# Patient Record
Sex: MALE | Race: WHITE | NOT HISPANIC OR LATINO | Employment: FULL TIME | URBAN - NONMETROPOLITAN AREA
[De-identification: names, ages, dates, MRNs, and addresses within clinical notes are randomized per-mention and may not be internally consistent; named-entity substitution may affect disease eponyms.]

---
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
## 2019-03-05 ENCOUNTER — OFFICE VISIT (OUTPATIENT)
Dept: URGENT CARE | Facility: PHYSICIAN GROUP | Age: 19
End: 2019-03-05
Payer: COMMERCIAL

## 2019-03-05 ENCOUNTER — APPOINTMENT (OUTPATIENT)
Dept: RADIOLOGY | Facility: IMAGING CENTER | Age: 19
End: 2019-03-05
Attending: FAMILY MEDICINE
Payer: COMMERCIAL

## 2019-03-05 VITALS
HEIGHT: 73 IN | HEART RATE: 98 BPM | TEMPERATURE: 98.7 F | DIASTOLIC BLOOD PRESSURE: 68 MMHG | WEIGHT: 188 LBS | SYSTOLIC BLOOD PRESSURE: 120 MMHG | OXYGEN SATURATION: 100 % | BODY MASS INDEX: 24.92 KG/M2 | RESPIRATION RATE: 16 BRPM

## 2019-03-05 DIAGNOSIS — S90.112A CONTUSION OF LEFT GREAT TOE WITHOUT DAMAGE TO NAIL, INITIAL ENCOUNTER: ICD-10-CM

## 2019-03-05 DIAGNOSIS — S99.922A INJURY OF TOE ON LEFT FOOT, INITIAL ENCOUNTER: ICD-10-CM

## 2019-03-05 PROCEDURE — 73660 X-RAY EXAM OF TOE(S): CPT | Mod: TC,FY,LT | Performed by: FAMILY MEDICINE

## 2019-03-05 PROCEDURE — 99204 OFFICE O/P NEW MOD 45 MIN: CPT | Performed by: FAMILY MEDICINE

## 2019-03-05 NOTE — PROGRESS NOTES
Chief Complaint:    Chief Complaint   Patient presents with   • Toe Injury     L big toe/ smashed it happened yesterday/        History of Present Illness:    This is a new problem. He has pain and bruising in left 1st toe, at least moderate severity, and not getting better. He was skateboarding yesterday, was doing a trick, and skateboard landed directly on toe. Took Ibuprofen for symptoms with some temporary help. He has broken a bone in foot in past and was still able to walk around on it.      Review of Systems:    Constitutional: Negative for fever, chills, and diaphoresis.   Eyes: Negative for change in vision, photophobia, pain, redness, and discharge.  ENT: Negative for ear pain, ear discharge, hearing loss, tinnitus, nasal congestion, nosebleeds, and sore throat.    Respiratory: Negative for cough, hemoptysis, sputum production, shortness of breath, wheezing, and stridor.    Cardiovascular: Negative for chest pain, palpitations, orthopnea, claudication, leg swelling, and PND.   Gastrointestinal: Negative for abdominal pain, nausea, vomiting, diarrhea, constipation, blood in stool, and melena.   Genitourinary: Negative for dysuria, urinary urgency, urinary frequency, hematuria, and flank pain.   Musculoskeletal: See HPI.  Skin: Negative for rash and itching.   Neurological: Negative for dizziness, tingling, tremors, sensory change, speech change, focal weakness, seizures, loss of consciousness, and headaches.   Endo: Negative for polydipsia.   Heme: Does not bruise/bleed easily.   Psychiatric/Behavioral: Negative for depression, suicidal ideas, hallucinations, memory loss and substance abuse. The patient is not nervous/anxious and does not have insomnia.        Past Medical History:    Past Medical History:   Diagnosis Date   • ASTHMA      Past Surgical History:    History reviewed. No pertinent surgical history.    Social History:    Social History     Social History   • Marital status: Single     Spouse  "name: N/A   • Number of children: N/A   • Years of education: N/A     Occupational History   • Not on file.     Social History Main Topics   • Smoking status: Never Smoker   • Smokeless tobacco: Never Used   • Alcohol use No   • Drug use: No   • Sexual activity: No     Other Topics Concern   • Not on file     Social History Narrative   • No narrative on file     Family History:    History reviewed. No pertinent family history.    Medications:    Current Outpatient Prescriptions on File Prior to Visit   Medication Sig Dispense Refill   • cetirizine (ZYRTEC) 10 MG TABS Take 10 mg by mouth every day.       No current facility-administered medications on file prior to visit.      Allergies:    Allergies   Allergen Reactions   • No Known Drug Allergy        Vitals:    Vitals:    03/05/19 1432   BP: 120/68   Pulse: 98   Resp: 16   Temp: 37.1 °C (98.7 °F)   TempSrc: Temporal   SpO2: 100%   Weight: 85.3 kg (188 lb)   Height: 1.854 m (6' 1\")       Physical Exam:    Constitutional: Vital signs reviewed. Appears well-developed and well-nourished. No acute distress.   Eyes: Sclera white, conjunctivae clear.  ENT: External ears normal. Hearing normal.  Cardiovascular: Peripheral pulses 2+.   Pulmonary/Chest: Respirations non-labored.  Musculoskeletal: Left 1st toe: tenderness to palpation and soft tissue swelling. Bruising beneath proximal nail.   Neurological: Alert and oriented to person, place, and time. Muscle tone normal. Coordination normal. Light touch and sensation normal.   Skin: No rashes or lesions. Warm, dry, normal turgor.  Psychiatric: Normal mood and affect. Behavior is normal. Judgment and thought content normal.       Diagnostics:    DX-TOE(S) 2+ (Order #[id number]) on 3/5/19   Narrative       3/5/2019 2:46 PM    HISTORY/REASON FOR EXAM:  Pain/Deformity Following Trauma.  Great toe injury yesterday    TECHNIQUE/EXAM DESCRIPTION AND NUMBER OF VIEWS:  3 views of the LEFT toes.    COMPARISON: Contralateral foot " 7/13/2017    FINDINGS:  No acute fracture or subluxation is seen.    Joint spaces are preserved    There is chronic second distal interphalangeal joint deformity with valgus angulation, identical to the contralateral foot indicating this is developmental.    No periosteal reaction   Impression       No radiographic evidence of acute traumatic injury     I personally reviewed the images. Images and Rad report reviewed with him and copies to him.      Assessment / Plan:    1. Injury of toe on left foot, initial encounter  - DX-TOE(S) 2+ LEFT; Future    2. Contusion of left great toe without damage to nail, initial encounter      Work note given - excuse for 3/5/19.    Discussed with him DDX, management options, and risks, benefits, and alternatives to treatment plan agreed upon.    Rec'd relative rest.    May continue over-the-counter Ibuprofen (Motrin or Advil) as needed for pain and swelling for anti-inflammatory effect.    Discussed expected course of duration, time for improvement, and to seek follow-up in Emergency Room, urgent care, or with PCP if getting worse at any time or not improving within expected time frame.

## 2019-03-05 NOTE — LETTER
March 5, 2019         Patient: Geronimo Huerta   YOB: 2000   Date of Visit: 3/5/2019           To Whom it May Concern:    Geronimo Huerta was seen in my clinic on 3/5/2019.     Please excuse from work for 3/5/19 due to medical condition.    If you have any questions or concerns, please don't hesitate to call.        Sincerely,           Denny Keene M.D.  Electronically Signed

## 2019-08-20 ENCOUNTER — HOSPITAL ENCOUNTER (OUTPATIENT)
Dept: HOSPITAL 8 - CFH | Age: 19
Discharge: HOME | End: 2019-08-20
Attending: NURSE PRACTITIONER
Payer: COMMERCIAL

## 2019-08-20 DIAGNOSIS — R22.2: Primary | ICD-10-CM

## 2019-08-20 PROCEDURE — 76604 US EXAM CHEST: CPT

## 2019-11-01 ENCOUNTER — OFFICE VISIT (OUTPATIENT)
Dept: URGENT CARE | Facility: CLINIC | Age: 19
End: 2019-11-01
Payer: COMMERCIAL

## 2019-11-01 VITALS
OXYGEN SATURATION: 97 % | DIASTOLIC BLOOD PRESSURE: 70 MMHG | WEIGHT: 199 LBS | SYSTOLIC BLOOD PRESSURE: 122 MMHG | RESPIRATION RATE: 14 BRPM | HEIGHT: 73 IN | TEMPERATURE: 98.8 F | HEART RATE: 82 BPM | BODY MASS INDEX: 26.37 KG/M2

## 2019-11-01 DIAGNOSIS — J01.40 ACUTE PANSINUSITIS, RECURRENCE NOT SPECIFIED: ICD-10-CM

## 2019-11-01 PROCEDURE — 99214 OFFICE O/P EST MOD 30 MIN: CPT | Performed by: NURSE PRACTITIONER

## 2019-11-01 RX ORDER — AMOXICILLIN AND CLAVULANATE POTASSIUM 875; 125 MG/1; MG/1
1 TABLET, FILM COATED ORAL 2 TIMES DAILY
Qty: 14 TAB | Refills: 0 | Status: SHIPPED | OUTPATIENT
Start: 2019-11-01 | End: 2019-11-08

## 2019-11-01 RX ORDER — FLUTICASONE PROPIONATE 50 MCG
2 SPRAY, SUSPENSION (ML) NASAL DAILY
Qty: 1 BOTTLE | Refills: 0 | Status: SHIPPED | OUTPATIENT
Start: 2019-11-01 | End: 2019-11-15

## 2019-11-01 ASSESSMENT — ENCOUNTER SYMPTOMS
NECK PAIN: 0
COUGH: 1
WEIGHT LOSS: 0
DIAPHORESIS: 0
SWOLLEN GLANDS: 0
WHEEZING: 0
HEARTBURN: 0
SINUS PRESSURE: 1
HEMOPTYSIS: 0
RHINORRHEA: 0
FEVER: 0
SHORTNESS OF BREATH: 0
MYALGIAS: 0
HOARSE VOICE: 1
SWEATS: 0
CHILLS: 1
SORE THROAT: 1
HEADACHES: 1

## 2019-11-01 NOTE — PROGRESS NOTES
Subjective:      Geronimo Huerta is a 19 y.o. male who presents with Nasal Congestion; Cough; and Sinus Problem    Reviewed past medical, surgical and family history. Reviewed prescription and OTC medications with patient in electronic health record today      No known drug allergy          Cough   The current episode started in the past 7 days. The problem has been gradually worsening. The problem occurs constantly. The cough is non-productive. Associated symptoms include chills, ear pain (pressure ), headaches, nasal congestion, postnasal drip and a sore throat. Pertinent negatives include no chest pain, ear congestion, fever, heartburn, hemoptysis, myalgias, rash, rhinorrhea, shortness of breath, sweats, weight loss or wheezing. The symptoms are aggravated by lying down. He has tried OTC cough suppressant and cool air (Muscinex, Vicks cold) for the symptoms. The treatment provided mild relief.   Sinus Problem   This is a new problem. The current episode started in the past 7 days. The problem has been rapidly worsening since onset. His pain is at a severity of 5/10. The pain is moderate. Associated symptoms include chills, coughing, ear pain (pressure ), headaches, a hoarse voice, sinus pressure, sneezing and a sore throat. Pertinent negatives include no congestion, diaphoresis, neck pain, shortness of breath or swollen glands. Past treatments include saline sprays. The treatment provided mild relief.       Review of Systems   Constitutional: Positive for chills. Negative for diaphoresis, fever and weight loss.   HENT: Positive for ear pain (pressure ), hoarse voice, postnasal drip, sinus pressure, sneezing and sore throat. Negative for congestion and rhinorrhea.    Respiratory: Positive for cough. Negative for hemoptysis, shortness of breath and wheezing.    Cardiovascular: Negative for chest pain.   Gastrointestinal: Negative for heartburn.   Musculoskeletal: Negative for myalgias and neck pain.   Skin:  "Negative for rash.   Neurological: Positive for headaches.          Objective:     /70 (BP Location: Right arm, Patient Position: Sitting)   Pulse 82   Temp 37.1 °C (98.8 °F)   Resp 14   Ht 1.854 m (6' 1\")   Wt 90.3 kg (199 lb)   SpO2 97%   BMI 26.25 kg/m²      Physical Exam   Constitutional: Vital signs are normal. He appears well-developed and well-nourished.  Non-toxic appearance. He does not have a sickly appearance.   HENT:   Head: Normocephalic.   Right Ear: Hearing, tympanic membrane, external ear and ear canal normal.   Left Ear: Hearing, tympanic membrane, external ear and ear canal normal.   Nose: Right sinus exhibits frontal sinus tenderness. Right sinus exhibits no maxillary sinus tenderness. Left sinus exhibits frontal sinus tenderness. Left sinus exhibits no maxillary sinus tenderness.   Mouth/Throat: Uvula is midline. Oropharyngeal exudate present. No posterior oropharyngeal edema, posterior oropharyngeal erythema or tonsillar abscesses.   Eyes: Conjunctivae and lids are normal.   Neck: Trachea normal, normal range of motion and full passive range of motion without pain. Neck supple.   Cardiovascular: Normal rate, regular rhythm, intact distal pulses and normal pulses. PMI is not displaced.   Pulmonary/Chest: Effort normal and breath sounds normal.   Abdominal: Soft.   Lymphadenopathy:        Head (right side): No tonsillar adenopathy present.        Head (left side): No tonsillar adenopathy present.     He has no cervical adenopathy.        Right: No supraclavicular adenopathy present.        Left: No supraclavicular adenopathy present.   Neurological: He is alert.   Skin: Skin is warm, dry and intact.   Psychiatric: He has a normal mood and affect. His speech is normal.   Nursing note and vitals reviewed.              Assessment/Plan:     1. Acute pansinusitis, recurrence not specified  fluticasone (FLONASE) 50 MCG/ACT nasal spray    amoxicillin-clavulanate (AUGMENTIN) 875-125 MG Tab "       FU with PCP or return to Urgent Care in 5-7 days if no improvement in symptoms, sooner if increased or worsening symptoms.   Humidifier at noc may be beneficial.   OTC antihistamine of choice - non-drowsy. Take as directed by   OTC fluticasone of choice- Take as directed by   Keep well hydrated    Educated in proper administration of medication(s) ordered today including safety, possible SE, risks, benefits, rationale and alternatives to therapy.

## 2022-02-27 ENCOUNTER — OFFICE VISIT (OUTPATIENT)
Dept: URGENT CARE | Facility: CLINIC | Age: 22
End: 2022-02-27
Payer: COMMERCIAL

## 2022-02-27 VITALS
WEIGHT: 234 LBS | DIASTOLIC BLOOD PRESSURE: 68 MMHG | TEMPERATURE: 96.9 F | HEIGHT: 73 IN | SYSTOLIC BLOOD PRESSURE: 130 MMHG | BODY MASS INDEX: 31.01 KG/M2 | RESPIRATION RATE: 16 BRPM | HEART RATE: 94 BPM | OXYGEN SATURATION: 97 %

## 2022-02-27 DIAGNOSIS — H10.9 CONJUNCTIVITIS OF BOTH EYES, UNSPECIFIED CONJUNCTIVITIS TYPE: ICD-10-CM

## 2022-02-27 DIAGNOSIS — J01.40 ACUTE NON-RECURRENT PANSINUSITIS: ICD-10-CM

## 2022-02-27 DIAGNOSIS — H65.01 RIGHT ACUTE SEROUS OTITIS MEDIA, RECURRENCE NOT SPECIFIED: ICD-10-CM

## 2022-02-27 PROCEDURE — 99213 OFFICE O/P EST LOW 20 MIN: CPT | Performed by: PHYSICIAN ASSISTANT

## 2022-02-27 RX ORDER — AMOXICILLIN AND CLAVULANATE POTASSIUM 875; 125 MG/1; MG/1
1 TABLET, FILM COATED ORAL 2 TIMES DAILY
Qty: 20 TABLET | Refills: 0 | Status: SHIPPED | OUTPATIENT
Start: 2022-02-27 | End: 2022-07-22

## 2022-02-27 RX ORDER — POLYMYXIN B SULFATE AND TRIMETHOPRIM 1; 10000 MG/ML; [USP'U]/ML
1 SOLUTION OPHTHALMIC 4 TIMES DAILY
Qty: 10 ML | Refills: 0 | Status: SHIPPED | OUTPATIENT
Start: 2022-02-27 | End: 2022-03-04

## 2022-02-27 ASSESSMENT — ENCOUNTER SYMPTOMS
HEADACHES: 1
SINUS PRESSURE: 1
WHEEZING: 0
SINUS PAIN: 1
COUGH: 1
SPUTUM PRODUCTION: 0
EYE REDNESS: 1
HOARSE VOICE: 0
SHORTNESS OF BREATH: 0
FEVER: 0
CHILLS: 0
SORE THROAT: 0
EYE DISCHARGE: 1

## 2022-02-27 ASSESSMENT — VISUAL ACUITY: OU: 1

## 2022-02-27 NOTE — PROGRESS NOTES
Subjective     Geronimo Huerta is a 21 y.o. male who presents with Sinus Problem (Sinus problem and congested started 2 weeks ago)            Patient presents with sinus congestion, sinus pressure, sinus headache, purulent discharge from his nose, postnasal drip, ear pressure, cough and red goopy eyes. Patient states his symptoms began about 2 weeks ago, and have not improved despite taking over-the-counter allergy medication, cough cold medications, NyQuil. Patient has been seen twice over the last week for his symptoms, had a negative strep test was diagnosed with a viral URI. Patient was seen a few days ago for his discharge from his eye, was given a prescription for allergy eyedrops which have not improved his symptoms. Patient denies any nausea, vomiting, diarrhea, chest pain or shortness of breath. Patient feels he has a  sinus infection.  No other complaints. Patient not vaccinated against COVID-19, patient has never been diagnosed with COVID-19.      Sinus Problem  This is a new problem. Episode onset: 2 weeks. The problem has been gradually worsening since onset. There has been no fever. His pain is at a severity of 8/10. Associated symptoms include congestion, coughing, ear pain, headaches and sinus pressure. Pertinent negatives include no chills, hoarse voice, shortness of breath, sneezing or sore throat. Past treatments include acetaminophen, oral decongestants and saline sprays (otc cough and allergy medicine). The treatment provided no relief.       Review of Systems   Constitutional: Negative for chills and fever.   HENT: Positive for congestion, ear pain, sinus pressure and sinus pain. Negative for ear discharge, hoarse voice, sneezing and sore throat.    Eyes: Positive for discharge and redness.   Respiratory: Positive for cough. Negative for sputum production, shortness of breath and wheezing.    Neurological: Positive for headaches.   Endo/Heme/Allergies: Positive for environmental allergies.  "  All other systems reviewed and are negative.             Objective     /68   Pulse 94   Temp 36.1 °C (96.9 °F)   Resp 16   Ht 1.854 m (6' 1\")   Wt 106 kg (234 lb)   SpO2 97%   BMI 30.87 kg/m²      Physical Exam  Vitals and nursing note reviewed. Exam conducted with a chaperone present.   Constitutional:       General: He is not in acute distress.     Appearance: Normal appearance. He is well-developed and normal weight. He is not ill-appearing or toxic-appearing.   HENT:      Head: Normocephalic and atraumatic.      Right Ear: Ear canal normal. A middle ear effusion is present. Tympanic membrane is retracted.      Left Ear: Tympanic membrane and ear canal normal.      Nose: Congestion and rhinorrhea present. Rhinorrhea is purulent.      Right Sinus: Maxillary sinus tenderness and frontal sinus tenderness present.      Left Sinus: Maxillary sinus tenderness and frontal sinus tenderness present.      Mouth/Throat:      Lips: Pink.      Mouth: Mucous membranes are moist.      Pharynx: Uvula midline. No posterior oropharyngeal erythema.      Comments: Purulent postnasal drip visible and posterior oropharynx.  Eyes:      General: Lids are normal. Vision grossly intact.         Right eye: Discharge present.         Left eye: Discharge present.     Extraocular Movements: Extraocular movements intact.      Conjunctiva/sclera:      Right eye: Right conjunctiva is injected. Exudate present.      Left eye: Left conjunctiva is injected. Exudate present.      Pupils: Pupils are equal, round, and reactive to light.   Cardiovascular:      Rate and Rhythm: Normal rate and regular rhythm.      Heart sounds: Normal heart sounds.   Pulmonary:      Effort: Pulmonary effort is normal.      Breath sounds: Normal breath sounds. No rales.   Abdominal:      Palpations: Abdomen is soft.   Musculoskeletal:         General: Normal range of motion.      Cervical back: Normal range of motion and neck supple.   Skin:     General: " Skin is warm.      Capillary Refill: Capillary refill takes less than 2 seconds.   Neurological:      General: No focal deficit present.      Mental Status: He is alert and oriented to person, place, and time.      Gait: Gait normal.   Psychiatric:         Mood and Affect: Mood normal.         Behavior: Behavior is cooperative.                   Assessment & Plan           1. Acute non-recurrent pansinusitis  amoxicillin-clavulanate (AUGMENTIN) 875-125 MG Tab    polymixin-trimethoprim (POLYTRIM) 17933-9.1 UNIT/ML-% Solution   2. Conjunctivitis of both eyes, unspecified conjunctivitis type  amoxicillin-clavulanate (AUGMENTIN) 875-125 MG Tab    polymixin-trimethoprim (POLYTRIM) 29610-7.1 UNIT/ML-% Solution   3. Right acute serous otitis media, recurrence not specified  amoxicillin-clavulanate (AUGMENTIN) 875-125 MG Tab    polymixin-trimethoprim (POLYTRIM) 62399-9.1 UNIT/ML-% Solution     Patient was evaluated in clinic today while wearing appropriate personal protective equipment.      Patient politely declined Covid testing as he would be out of the quarantine window.    PT to begin prescription medications today as discussed for pansinusitis, serous otitis media of right ear, and bilateral conjunctivitis.    Patient can continue over-the-counter allergy and cough cold medications as desired for his symptom relief.    PT should follow up with PCP in 1-2 days for re-evaluation if symptoms have not improved.      Discussed red flags and reasons to return to UC or ED.      Pt and/or family verbalized understanding of diagnosis and follow up instructions and was offered informational handout on diagnosis.  PT discharged.

## 2022-07-15 ENCOUNTER — TELEPHONE (OUTPATIENT)
Dept: SCHEDULING | Facility: IMAGING CENTER | Age: 22
End: 2022-07-15

## 2022-07-22 ENCOUNTER — OFFICE VISIT (OUTPATIENT)
Dept: MEDICAL GROUP | Facility: PHYSICIAN GROUP | Age: 22
End: 2022-07-22
Payer: COMMERCIAL

## 2022-07-22 VITALS
SYSTOLIC BLOOD PRESSURE: 120 MMHG | OXYGEN SATURATION: 96 % | BODY MASS INDEX: 28.23 KG/M2 | DIASTOLIC BLOOD PRESSURE: 62 MMHG | RESPIRATION RATE: 16 BRPM | HEIGHT: 74 IN | HEART RATE: 78 BPM | WEIGHT: 220 LBS | TEMPERATURE: 97.7 F

## 2022-07-22 DIAGNOSIS — G89.29 CHRONIC LEFT SHOULDER PAIN: ICD-10-CM

## 2022-07-22 DIAGNOSIS — A60.01 HERPES SIMPLEX INFECTION OF PENIS: ICD-10-CM

## 2022-07-22 DIAGNOSIS — M25.561 CHRONIC PAIN OF RIGHT KNEE: ICD-10-CM

## 2022-07-22 DIAGNOSIS — G89.29 CHRONIC PAIN OF RIGHT KNEE: ICD-10-CM

## 2022-07-22 DIAGNOSIS — Z00.00 PHYSICAL EXAM, ANNUAL: ICD-10-CM

## 2022-07-22 DIAGNOSIS — M25.512 CHRONIC LEFT SHOULDER PAIN: ICD-10-CM

## 2022-07-22 PROCEDURE — 99214 OFFICE O/P EST MOD 30 MIN: CPT | Performed by: NURSE PRACTITIONER

## 2022-07-22 RX ORDER — ACYCLOVIR 200 MG/1
200 CAPSULE ORAL 2 TIMES DAILY
COMMUNITY

## 2022-07-22 ASSESSMENT — PATIENT HEALTH QUESTIONNAIRE - PHQ9: CLINICAL INTERPRETATION OF PHQ2 SCORE: 0

## 2022-07-22 NOTE — PROGRESS NOTES
Chief Complaint   Patient presents with   • Knee Pain     (R) knee xlast year. Soreness when he moves his knee a lot. Has to take his time bending down.   • Shoulder Pain     (L) shoulder popping and sore when he uses it a lot   • Establish Care      Subjective:     Geronimo Huerta is a 22 y.o. male presenting to establish care and talk about right shoulder       Herpes simplex infection of penis  Chronic and stable condition   Was diagnosed with HSV to his penis at 17 years old  Has been taking this daily for the last 6-7 months for suppression and has not had an outbreak  States he has not had any outbreaks since starting the medication       Chronic left shoulder pain  New to me   Chronic and stable condition   Onset 18 yo  Rest no pain but with over use causes shoulder pain, numbness and tingling, sometimes gets stuck  Notes pain radiates down to left elbow at times  Has noticed a decrease in strength on left side  Has tried chiropractor, compression sleeves, move free joint supplement, heating pads   Denies trauma to that shoulder but states he did have a fall to his right side of body.       Chronic pain of right knee  New to me  Onset 1 year ago  States he hurt his knee a few years ago and noted improvement   States with over use he notices his knee gets sore  Feels that there is a bulge of the lateral side of his knee  Has tried supplements, biofreeze, brace, compression, elevation  States popping sounds at times, hard to get down on one knee on right side.  States sensation of instability on right knee.        ROS   See hpi    Current Outpatient Medications:   •  acyclovir (ZOVIRAX) 200 MG Cap, Take 200 mg by mouth 2 times a day., Disp: , Rfl:     Past Medical History:   Diagnosis Date   • ASTHMA    • Viral pharyngitis 2/2/2011       History reviewed. No pertinent surgical history.    Social History     Tobacco Use   • Smoking status: Former Smoker     Packs/day: 0.50     Years: 6.00     Pack years: 3.00  "    Types: Cigarettes     Start date: 2016     Quit date: 2022     Years since quittin.2   • Smokeless tobacco: Never Used   Vaping Use   • Vaping Use: Every day   • Start date: 2022   • Substances: Nicotine   • Devices: Disposable   Substance Use Topics   • Alcohol use: Yes     Alcohol/week: 0.6 oz     Types: 1 Cans of beer per week   • Drug use: Not Currently     Types: Marijuana     Comment: occasionaly       Family History   Problem Relation Age of Onset   • Diabetes Mother    • Neuropathy Mother    • No Known Problems Father    • Depression Maternal Aunt    • No Known Problems Maternal Aunt    • No Known Problems Maternal Aunt    • No Known Problems Maternal Uncle    • No Known Problems Maternal Uncle        No known drug allergy    Allergies, past medical history, past surgical history, family history, social history reviewed and updated    Objective:     Vitals: /62 (BP Location: Left arm, Patient Position: Sitting, BP Cuff Size: Adult)   Pulse 78   Temp 36.5 °C (97.7 °F) (Temporal)   Resp 16   Ht 1.88 m (6' 2\")   Wt 99.8 kg (220 lb)   SpO2 96%   BMI 28.25 kg/m²   General: Alert, pleasant, NAD  EYES:   PERRL, EOMI, no icterus or pallor.  Conjunctivae and lids normal.   HENT:  Normocephalic.  External ears normal. Tympanic membranes pearly, opaque.  No nasal drainage present.  Oropharynx non-erythematous, mucous membranes moist.  Neck supple.   No thyromegaly or masses palpated. No cervical or supraclavicular lymphadenopathy.  Heart: Regular rate and rhythm.  S1 and S2 normal.  No murmurs appreciated.  Respiratory: Normal respiratory effort.  Clear to auscultation bilaterally.  Skin: Warm, dry, no rashes.  Musculoskeletal: Gait is normal.  Moves all extremities well.    Extremities: No clubbing, cyanosis or edema noted. alpley scratch completed with noted pain during range of motion when arm placed above head, positive Neer, erbers and painful arch. Negative Jobs   Neurological: " No tremors, sensation grossly intact, tone/strength normal, gait is normal, CN2-12 intact.  Psych:  Affect/mood is normal, judgement is good, memory is intact, grooming is appropriate.    Assessment/Plan:     1. Physical exam, annual  - CBC WITHOUT DIFFERENTIAL; Future  - Comp Metabolic Panel; Future  - Lipid Profile; Future  - TSH WITH REFLEX TO FT4; Future    2. Herpes simplex infection of penis  Continue with acyclovir daily     3. Chronic left shoulder pain  - DX-SHOULDER 2+ LEFT; Future  - Referral to Physical Therapy  4. Chronic pain of right knee  - DX-KNEE 3 VIEWS RIGHT; Future  - Referral to Physical Therapy  Continue with heat and ice therapy, compression of knee, rest and elevation of knee, tylenol and ibuprofen OTC every 8 hours as needed for pain        Discussed with patient possible alternative diagnoses, patient is to take all medications as prescribed.     If symptoms persist FU w/PCP, if symptoms worsen go to emergency room.     If experiencing any side effects from prescribed medications reports to the office immediately or go to emergency room.    Reviewed indication, dosage, usage and potential adverse effects of prescribed medications.     Reviewed risks and benefits of treatment plan. Patient verbalizes understanding of all instruction and verbally agrees to plan.    Discussed plan with the patient, and she agrees to the above.      I personally reviewed prior external notes and test results pertinent to today's visit.        Return in about 3 weeks (around 8/12/2022) for labs and imaging.      Please note that this dictation was created using voice recognition software. I have made every reasonable attempt to correct obvious errors, but I expect that there may be errors of grammar and possibly content that I did not discover before finalizing the note.

## 2022-07-22 NOTE — ASSESSMENT & PLAN NOTE
Chronic and stable condition   Was diagnosed with HSV to his penis at 17 years old  Has been taking this daily for the last 6-7 months for suppression and has not had an outbreak  States he has not had any outbreaks since starting the medication

## 2022-07-22 NOTE — ASSESSMENT & PLAN NOTE
New to me   Chronic and stable condition   Onset 16 yo  Rest no pain but with over use causes shoulder pain, numbness and tingling, sometimes gets stuck  Notes pain radiates down to left elbow at times  Has noticed a decrease in strength on left side  Has tried chiropractor, compression sleeves, move free joint supplement, heating pads   Denies trauma to that shoulder but states he did have a fall to his right side of body.

## 2022-07-22 NOTE — ASSESSMENT & PLAN NOTE
New to me  Onset 1 year ago  States he hurt his knee a few years ago and noted improvement   States with over use he notices his knee gets sore  Feels that there is a bulge of the lateral side of his knee  Has tried supplements, biofreeze, brace, compression, elevation  States popping sounds at times, hard to get down on one knee on right side.  States sensation of instability on right knee.

## 2022-07-29 ENCOUNTER — HOSPITAL ENCOUNTER (OUTPATIENT)
Dept: RADIOLOGY | Facility: MEDICAL CENTER | Age: 22
End: 2022-07-29
Attending: NURSE PRACTITIONER
Payer: COMMERCIAL

## 2022-07-29 DIAGNOSIS — M25.561 CHRONIC PAIN OF RIGHT KNEE: ICD-10-CM

## 2022-07-29 DIAGNOSIS — G89.29 CHRONIC LEFT SHOULDER PAIN: ICD-10-CM

## 2022-07-29 DIAGNOSIS — G89.29 CHRONIC PAIN OF RIGHT KNEE: ICD-10-CM

## 2022-07-29 DIAGNOSIS — M25.512 CHRONIC LEFT SHOULDER PAIN: ICD-10-CM

## 2022-07-29 PROCEDURE — 73030 X-RAY EXAM OF SHOULDER: CPT | Mod: LT

## 2022-07-29 PROCEDURE — 73562 X-RAY EXAM OF KNEE 3: CPT | Mod: RT

## 2022-08-05 ENCOUNTER — HOSPITAL ENCOUNTER (OUTPATIENT)
Dept: LAB | Facility: MEDICAL CENTER | Age: 22
End: 2022-08-05
Attending: NURSE PRACTITIONER
Payer: COMMERCIAL

## 2022-08-05 DIAGNOSIS — Z00.00 PHYSICAL EXAM, ANNUAL: ICD-10-CM

## 2022-08-05 LAB
ALBUMIN SERPL BCP-MCNC: 4.7 G/DL (ref 3.2–4.9)
ALBUMIN/GLOB SERPL: 1.8 G/DL
ALP SERPL-CCNC: 58 U/L (ref 30–99)
ALT SERPL-CCNC: 23 U/L (ref 2–50)
ANION GAP SERPL CALC-SCNC: 10 MMOL/L (ref 7–16)
AST SERPL-CCNC: 21 U/L (ref 12–45)
BILIRUB SERPL-MCNC: 0.7 MG/DL (ref 0.1–1.5)
BUN SERPL-MCNC: 12 MG/DL (ref 8–22)
CALCIUM SERPL-MCNC: 9.2 MG/DL (ref 8.4–10.2)
CHLORIDE SERPL-SCNC: 106 MMOL/L (ref 96–112)
CHOLEST SERPL-MCNC: 203 MG/DL (ref 100–199)
CO2 SERPL-SCNC: 24 MMOL/L (ref 20–33)
CREAT SERPL-MCNC: 0.77 MG/DL (ref 0.5–1.4)
ERYTHROCYTE [DISTWIDTH] IN BLOOD BY AUTOMATED COUNT: 38.7 FL (ref 35.9–50)
FASTING STATUS PATIENT QL REPORTED: NORMAL
GFR SERPLBLD CREATININE-BSD FMLA CKD-EPI: 128 ML/MIN/1.73 M 2
GLOBULIN SER CALC-MCNC: 2.6 G/DL (ref 1.9–3.5)
GLUCOSE SERPL-MCNC: 98 MG/DL (ref 65–99)
HCT VFR BLD AUTO: 47.8 % (ref 42–52)
HDLC SERPL-MCNC: 43 MG/DL
HGB BLD-MCNC: 16.3 G/DL (ref 14–18)
LDLC SERPL CALC-MCNC: 144 MG/DL
MCH RBC QN AUTO: 29.7 PG (ref 27–33)
MCHC RBC AUTO-ENTMCNC: 34.1 G/DL (ref 33.7–35.3)
MCV RBC AUTO: 87.1 FL (ref 81.4–97.8)
PLATELET # BLD AUTO: 244 K/UL (ref 164–446)
PMV BLD AUTO: 10.2 FL (ref 9–12.9)
POTASSIUM SERPL-SCNC: 3.9 MMOL/L (ref 3.6–5.5)
PROT SERPL-MCNC: 7.3 G/DL (ref 6–8.2)
RBC # BLD AUTO: 5.49 M/UL (ref 4.7–6.1)
SODIUM SERPL-SCNC: 140 MMOL/L (ref 135–145)
TRIGL SERPL-MCNC: 82 MG/DL (ref 0–149)
TSH SERPL DL<=0.005 MIU/L-ACNC: 1.49 UIU/ML (ref 0.38–5.33)
WBC # BLD AUTO: 5.3 K/UL (ref 4.8–10.8)

## 2022-08-05 PROCEDURE — 84443 ASSAY THYROID STIM HORMONE: CPT

## 2022-08-05 PROCEDURE — 80061 LIPID PANEL: CPT

## 2022-08-05 PROCEDURE — 80053 COMPREHEN METABOLIC PANEL: CPT

## 2022-08-05 PROCEDURE — 36415 COLL VENOUS BLD VENIPUNCTURE: CPT

## 2022-08-05 PROCEDURE — 85027 COMPLETE CBC AUTOMATED: CPT

## 2022-08-12 ENCOUNTER — OFFICE VISIT (OUTPATIENT)
Dept: MEDICAL GROUP | Facility: PHYSICIAN GROUP | Age: 22
End: 2022-08-12
Payer: COMMERCIAL

## 2022-08-12 VITALS
BODY MASS INDEX: 28.54 KG/M2 | DIASTOLIC BLOOD PRESSURE: 66 MMHG | SYSTOLIC BLOOD PRESSURE: 118 MMHG | OXYGEN SATURATION: 99 % | HEIGHT: 74 IN | RESPIRATION RATE: 12 BRPM | HEART RATE: 77 BPM | TEMPERATURE: 97.5 F | WEIGHT: 222.4 LBS

## 2022-08-12 DIAGNOSIS — Z11.3 ROUTINE SCREENING FOR STI (SEXUALLY TRANSMITTED INFECTION): ICD-10-CM

## 2022-08-12 DIAGNOSIS — E78.00 ELEVATED LDL CHOLESTEROL LEVEL: ICD-10-CM

## 2022-08-12 DIAGNOSIS — G89.29 CHRONIC LEFT SHOULDER PAIN: ICD-10-CM

## 2022-08-12 DIAGNOSIS — M25.512 CHRONIC LEFT SHOULDER PAIN: ICD-10-CM

## 2022-08-12 PROCEDURE — 99395 PREV VISIT EST AGE 18-39: CPT | Performed by: NURSE PRACTITIONER

## 2022-08-12 ASSESSMENT — FIBROSIS 4 INDEX: FIB4 SCORE: 0.43

## 2022-08-12 NOTE — PROGRESS NOTES
Subjective:     CC:   Chief Complaint   Patient presents with    Lab Results       HPI:   Geronimo Huerta is a 22 y.o. male who presents for an annual exam and lab review. He is feeling well and has no complaints.    Health Maintenance  Advanced directive: NA   PT/vit D for falls prevention: NA   Cholesterol Screening: labs completed    Diabetes Screening: Labs completed   AAA Screening: NA   Diet: tries to make healthy choices   Exercise: is active at work but is thinking about joining a gym soon  Substance Abuse: none   Safe in relationship.   Seat belts, bike helmet, gun safety discussed.  Sun protection used.    Cancer screening  Colorectal Cancer Screening: NA    Lung Cancer Screening: NA    Prostate Cancer Screening/PSA: NA     Infectious disease screening/Immunizations  --STI Screening: new order placed  --Practices safe sex.  --HIV Screening: new order placed   --Hepatitis C Screening: NA   --Immunizations:    Influenza: Not available yet    HPV:  has not completed    Tetanus: unknown if UTD    Shingles: n/a    Pneumococcal : NA     COVID-19: has not completed yet    Elevated LDL cholesterol level  Chronic and stable condition.  Labs showed elevation in his , cholesterol 203, triglycerides 82, HDL 43    Chronic left shoulder pain  Chronic and stable condition  Patient has not yet been able to follow-up with physical therapy  He notes there has been improvement with his shoulder and his knee as he has not been active as much at work as they are not that busy       He  has a past medical history of ASTHMA and Viral pharyngitis (2/2/2011).  He  has no past surgical history on file.  Family History   Problem Relation Age of Onset    Diabetes Mother     Neuropathy Mother     No Known Problems Father     Depression Maternal Aunt     No Known Problems Maternal Aunt     No Known Problems Maternal Aunt     No Known Problems Maternal Uncle     No Known Problems Maternal Uncle      Social History     Tobacco  "Use    Smoking status: Former     Packs/day: 0.50     Years: 6.00     Pack years: 3.00     Types: Cigarettes     Start date: 2016     Quit date: 2022     Years since quittin.3    Smokeless tobacco: Never   Vaping Use    Vaping Use: Every day    Start date: 2022    Substances: Nicotine    Devices: Disposable   Substance Use Topics    Alcohol use: Yes     Alcohol/week: 0.6 oz     Types: 1 Cans of beer per week    Drug use: Not Currently     Types: Marijuana     Comment: occasionaly       Patient Active Problem List    Diagnosis Date Noted    Elevated LDL cholesterol level 2022    Herpes simplex infection of penis 2022    Chronic left shoulder pain 2022    Chronic pain of right knee 2022       Current Outpatient Medications   Medication Sig Dispense Refill    acyclovir (ZOVIRAX) 200 MG Cap Take 200 mg by mouth 2 times a day.       No current facility-administered medications for this visit.    (including changes today)  Allergies: No known drug allergy    ROS    See HPI  Objective:     /66 (BP Location: Right arm, Patient Position: Sitting, BP Cuff Size: Adult)   Pulse 77   Temp 36.4 °C (97.5 °F) (Temporal)   Resp 12   Ht 1.88 m (6' 2\")   Wt 101 kg (222 lb 6.4 oz)   SpO2 99%   BMI 28.55 kg/m²   Body mass index is 28.55 kg/m².  Wt Readings from Last 4 Encounters:   22 101 kg (222 lb 6.4 oz)   22 99.8 kg (220 lb)   22 106 kg (234 lb)   19 90.3 kg (199 lb)       Physical Exam  Constitutional:       Appearance: Normal appearance. He is normal weight.   HENT:      Head: Normocephalic and atraumatic.      Nose: Nose normal.   Eyes:      Extraocular Movements: Extraocular movements intact.      Conjunctiva/sclera: Conjunctivae normal.      Pupils: Pupils are equal, round, and reactive to light.   Pulmonary:      Effort: Pulmonary effort is normal.   Musculoskeletal:         General: Normal range of motion.   Skin:     General: Skin is dry. "   Neurological:      General: No focal deficit present.      Mental Status: He is alert and oriented to person, place, and time. Mental status is at baseline.   Psychiatric:         Mood and Affect: Mood normal.         Behavior: Behavior normal.         Thought Content: Thought content normal.         Judgment: Judgment normal.      Labs reviewed 8/5/2022    Assessment and Plan:     1. Elevated LDL cholesterol level  Continue with diet, exercise  We will continue to reevaluate next year    2. Routine screening for STI (sexually transmitted infection)  - Chlamydia/GC, PCR (Urine); Future  - HIV AG/AB COMBO ASSAY SCREENING; Future    3. Chronic left shoulder pain  Continue follow-up with physical therapy        Counseling about diet, supplements, exercise, skin care and safe sex.    Follow-up: Return in about 1 year (around 8/12/2023) for Annual physical exam.

## 2022-08-13 NOTE — ASSESSMENT & PLAN NOTE
Chronic and stable condition  Patient has not yet been able to follow-up with physical therapy  He notes there has been improvement with his shoulder and his knee as he has not been active as much at work as they are not that busy

## 2022-08-13 NOTE — ASSESSMENT & PLAN NOTE
Chronic and stable condition.  Labs showed elevation in his , cholesterol 203, triglycerides 82, HDL 43

## 2022-09-13 ENCOUNTER — OFFICE VISIT (OUTPATIENT)
Dept: URGENT CARE | Facility: CLINIC | Age: 22
End: 2022-09-13
Payer: COMMERCIAL

## 2022-09-13 VITALS
DIASTOLIC BLOOD PRESSURE: 70 MMHG | HEIGHT: 74 IN | BODY MASS INDEX: 28.04 KG/M2 | TEMPERATURE: 97.7 F | HEART RATE: 83 BPM | WEIGHT: 218.5 LBS | SYSTOLIC BLOOD PRESSURE: 120 MMHG | OXYGEN SATURATION: 96 % | RESPIRATION RATE: 16 BRPM

## 2022-09-13 DIAGNOSIS — H18.821 CORNEAL ABRASION OF RIGHT EYE DUE TO CONTACT LENS: ICD-10-CM

## 2022-09-13 PROCEDURE — 99213 OFFICE O/P EST LOW 20 MIN: CPT | Performed by: PHYSICIAN ASSISTANT

## 2022-09-13 RX ORDER — POLYMYXIN B SULFATE AND TRIMETHOPRIM 1; 10000 MG/ML; [USP'U]/ML
1 SOLUTION OPHTHALMIC EVERY 4 HOURS
Qty: 4 ML | Refills: 0 | Status: SHIPPED | OUTPATIENT
Start: 2022-09-13 | End: 2022-09-18

## 2022-09-13 ASSESSMENT — ENCOUNTER SYMPTOMS
COUGH: 0
EYE PAIN: 1
CONSTIPATION: 0
HEADACHES: 0
FEVER: 0
DIARRHEA: 0
CHILLS: 0
ABDOMINAL PAIN: 0
SORE THROAT: 0
NAUSEA: 0
SHORTNESS OF BREATH: 0
EYE REDNESS: 1
DOUBLE VISION: 0
VOMITING: 0
BLURRED VISION: 0
PHOTOPHOBIA: 0
MYALGIAS: 0
EYE DISCHARGE: 1

## 2022-09-13 ASSESSMENT — VISUAL ACUITY
OU: 1
OS_CC: 20/30
OD_CC: 20/30

## 2022-09-13 ASSESSMENT — FIBROSIS 4 INDEX: FIB4 SCORE: 0.43

## 2022-09-13 NOTE — LETTER
September 13, 2022    To Whom It May Concern:         This is confirmation that Geronimo ZANE Huerta attended his scheduled appointment with Alejandro Zepeda P.A.-C. on 9/13/22.           If you have any questions please do not hesitate to call me at the phone number listed below.    Sincerely,    Alejandro Zepeda P.A.-C.  [phone]

## 2022-09-13 NOTE — PROGRESS NOTES
"Subjective:   Geronimo Huerta is a 22 y.o. male who presents for Eye Problem ((R) eye sensation of something in eye x this morning )      22 years old male notes right eye irritation and fb sensation since last night. No va changes noted. Increased tearing noted. No known ocular trauma.  Tried irrigation earlier today and visine.  Wears glasses, no contacts.    Review of Systems   Constitutional:  Negative for chills and fever.   HENT:  Negative for congestion, ear pain and sore throat.    Eyes:  Positive for pain, discharge and redness. Negative for blurred vision, double vision and photophobia.   Respiratory:  Negative for cough and shortness of breath.    Cardiovascular:  Negative for chest pain.   Gastrointestinal:  Negative for abdominal pain, constipation, diarrhea, nausea and vomiting.   Genitourinary:  Negative for dysuria.   Musculoskeletal:  Negative for myalgias.   Skin:  Negative for rash.   Neurological:  Negative for headaches.     Medications, Allergies, and current problem list reviewed today in Epic.     Objective:     /70 (BP Location: Right arm, Patient Position: Sitting)   Pulse 83   Temp 36.5 °C (97.7 °F) (Temporal)   Resp 16   Ht 1.88 m (6' 2\")   Wt 99.1 kg (218 lb 8 oz)   SpO2 96%     Physical Exam  Vitals reviewed.   Constitutional:       Appearance: Normal appearance.   HENT:      Head: Normocephalic and atraumatic.      Right Ear: External ear normal.      Left Ear: External ear normal.      Nose: Nose normal.      Mouth/Throat:      Mouth: Mucous membranes are moist.   Eyes:      General: Lids are normal. Lids are everted, no foreign bodies appreciated. Vision grossly intact. Gaze aligned appropriately.         Right eye: No foreign body, discharge or hordeolum.      Extraocular Movements: Extraocular movements intact.      Conjunctiva/sclera:      Right eye: Right conjunctiva is injected. No chemosis, exudate or hemorrhage.     Pupils: Pupils are equal, round, and reactive " to light.      Right eye: Corneal abrasion and fluorescein uptake present. Fran exam negative.      Slit lamp exam:     Right eye: Anterior chamber quiet.     Cardiovascular:      Rate and Rhythm: Normal rate.   Pulmonary:      Effort: Pulmonary effort is normal.   Skin:     General: Skin is warm and dry.      Capillary Refill: Capillary refill takes less than 2 seconds.   Neurological:      Mental Status: He is alert and oriented to person, place, and time.       Assessment/Plan:     Diagnosis and associated orders:     1. Corneal abrasion of right eye due to contact lens  polymixin-trimethoprim (POLYTRIM) 77515-4.1 UNIT/ML-% Solution         Comments/MDM:     Polytrim for coverage  Vas normal as noted by MA  No signs of serious open globe/keratitis, series red flags  Emergency Room or ophtho if changes to Vas manifest         Differential diagnosis, natural history, supportive care, and indications for immediate follow-up discussed.    Advised the patient to follow-up with the primary care physician for recheck, reevaluation, and consideration of further management.    Please note that this dictation was created using voice recognition software. I have made a reasonable attempt to correct obvious errors, but I expect that there are errors of grammar and possibly content that I did not discover before finalizing the note.    This note was electronically signed by Alejandro Zepeda PA-C

## 2023-09-01 NOTE — ED PROVIDER NOTES
"ED Provider Note    Scribed for Vincent Downey M.D. by Fernando Becker. 7/13/2017, 12:47 PM.    Primary care provider: Pcp Pt States None  Means of arrival: walk in  History obtained from: Patient  History limited by: None    CHIEF COMPLAINT  Chief Complaint   Patient presents with   • Foot Pain       HPI  Geronimo Huerta is a 17 y.o. male who presents to the Emergency Department complaining of right foot pain. Patient was playing basketball when he twisted his foot. He denies ankle pain.     REVIEW OF SYSTEMS  Pertinent positives include foot pain.   Pertinent negatives include no ankle pain.    E.     PAST MEDICAL HISTORY   has a past medical history of Asthma.    SURGICAL HISTORY  patient denies any surgical history    SOCIAL HISTORY  Social History   Substance Use Topics   • Smoking status: Never Smoker    • Smokeless tobacco: No   • Alcohol Use: No      History   Drug Use No       FAMILY HISTORY  None noted    CURRENT MEDICATIONS  No current facility-administered medications for this encounter.    Current outpatient prescriptions:   •  ibuprofen (MOTRIN) 600 MG Tab, Take 1 Tab by mouth every 6 hours as needed., Disp: 20 Tab, Rfl: 0  •  metaxalone (SKELAXIN) 800 MG TABS, Take 1 Tab by mouth 4 times a day as needed for Mild Pain (take first dosage at home.  If makes drowsy no driving or operating machinery)., Disp: 30 Each, Rfl: 1  •  hydrocodone/acetaminophen (NORCO)  MG TABS, Take 1 Tab by mouth every 6 hours as needed for Mild Pain. No driving or operation of machinery after taking this medication., Disp: 15 Each, Rfl: 0  •  cetirizine (ZYRTEC) 10 MG TABS, Take 10 mg by mouth every day., Disp: , Rfl:     ALLERGIES  Allergies   Allergen Reactions   • No Known Drug Allergy        PHYSICAL EXAM  VITAL SIGNS: /43 mmHg  Pulse 85  Temp(Src) 36.1 °C (97 °F)  Resp 12  Ht 1.854 m (6' 1\")  Wt 82.4 kg (181 lb 10.5 oz)  BMI 23.97 kg/m2  SpO2 96%    Nursing note and vitals " reviewed.  Constitutional: No distress.   HENT: Head is atraumatic. Oropharynx is moist.   Eyes: Conjunctivae are normal. Pupils are equal, round, and reactive to light.   Cardiovascular: Normal peripheral perfusion  Respiratory: No respiratory distress.   Musculoskeletal: Tenderness and swelling over the right lateral midfoot. No tenderness of the ankle of proximal leg.   Neurological: Alert. No focal deficits noted.    Skin: No rash.   Psych: Appropriate for clinical situation     DIAGNOSTIC STUDIES / PROCEDURES    RADIOLOGY  DX-FOOT-COMPLETE 3+ RIGHT   Final Result      Subtle fracture base of the fifth metatarsal.      The radiologist's interpretation of all radiological studies have been reviewed by me.    COURSE & MEDICAL DECISION MAKING  Nursing notes, VS, PMSFHx reviewed in chart.    12:47 PM - Patient seen and examined at bedside. Ordered DX foot to evaluate his symptoms.     1:28 PM Recheck: Patient re-evaluated at beside. Discussed patient's condition and treatment plan. Patient's radiology results discussed. Patient will be sent home with a prescription for Motrin 600 mg. Advised to use his boot and crutches for the next few weeks. Instructed to return to Emergency Department immediately if any worsening symptoms.    DISPOSITION:  Patient will be discharged home in stable condition.    FOLLOW UP:  [facility], Emergency Dept  [address] [facility] [address] [phone]    If symptoms worsen    Sharif Denise M.D.  [address]  [address]  [facility] [address]  [phone]    Schedule an appointment as soon as possible for a visit        OUTPATIENT MEDICATIONS:  New Prescriptions    IBUPROFEN (MOTRIN) 600 MG TAB    Take 1 Tab by mouth every 6 hours as needed.       The patient was discharged home with an information sheet on fracture and told to return immediately for any signs or symptoms listed.  The patient agreed to the discharge precautions and follow-up plan which is  documented in EPIC.    FINAL IMPRESSION  1. Foot fracture, right, closed, initial encounter          IFernando (Scribe), am scribing for, and in the presence of, Vincent Downey M.D..    Electronically signed by: Fernando Becker (Scribe), 7/13/2017    IVincent M.D. personally performed the services described in this documentation, as scribed by Fernando Becker in my presence, and it is both accurate and complete.    The note accurately reflects work and decisions made by me.  Vincent Downey  7/13/2017  3:13 PM     If you are a smoker, it is important for your health to stop smoking. Please be aware that second hand smoke is also harmful.